# Patient Record
Sex: MALE | Race: BLACK OR AFRICAN AMERICAN | Employment: FULL TIME | URBAN - NONMETROPOLITAN AREA
[De-identification: names, ages, dates, MRNs, and addresses within clinical notes are randomized per-mention and may not be internally consistent; named-entity substitution may affect disease eponyms.]

---

## 2020-09-24 ENCOUNTER — OFFICE VISIT (OUTPATIENT)
Dept: PRIMARY CARE CLINIC | Age: 40
End: 2020-09-24
Payer: COMMERCIAL

## 2020-09-24 VITALS
SYSTOLIC BLOOD PRESSURE: 126 MMHG | DIASTOLIC BLOOD PRESSURE: 72 MMHG | BODY MASS INDEX: 36.73 KG/M2 | TEMPERATURE: 97.7 F | HEIGHT: 69 IN | OXYGEN SATURATION: 98 % | HEART RATE: 53 BPM | WEIGHT: 248 LBS

## 2020-09-24 PROCEDURE — 99213 OFFICE O/P EST LOW 20 MIN: CPT | Performed by: FAMILY MEDICINE

## 2020-09-24 RX ORDER — IBUPROFEN 600 MG/1
600 TABLET ORAL EVERY 8 HOURS PRN
Qty: 90 TABLET | Refills: 3 | Status: SHIPPED | OUTPATIENT
Start: 2020-09-24

## 2020-09-24 RX ORDER — CYCLOBENZAPRINE HCL 10 MG
10 TABLET ORAL 3 TIMES DAILY PRN
Qty: 30 TABLET | Refills: 0 | Status: SHIPPED | OUTPATIENT
Start: 2020-09-24 | End: 2020-10-04

## 2020-09-24 ASSESSMENT — ENCOUNTER SYMPTOMS
RESPIRATORY NEGATIVE: 1
GASTROINTESTINAL NEGATIVE: 1
BACK PAIN: 1
EYES NEGATIVE: 1
ALLERGIC/IMMUNOLOGIC NEGATIVE: 1

## 2020-09-24 ASSESSMENT — PATIENT HEALTH QUESTIONNAIRE - PHQ9
1. LITTLE INTEREST OR PLEASURE IN DOING THINGS: 0
SUM OF ALL RESPONSES TO PHQ QUESTIONS 1-9: 0
SUM OF ALL RESPONSES TO PHQ9 QUESTIONS 1 & 2: 0
2. FEELING DOWN, DEPRESSED OR HOPELESS: 0
SUM OF ALL RESPONSES TO PHQ QUESTIONS 1-9: 0

## 2020-09-24 NOTE — PROGRESS NOTES
2020     Levar Martinez (:  1980) is a 36 y.o. male, here for evaluation of the following medical concerns:    HPI   Acute urgent care visit for left sided back strain. He was picking dtr. 85lbs up to put her over a wood fence. Acute pain in left mid back. Had to lift her back over the fence after she retrieved a basketball. Pain persistent since. Feeling tight. No prior back issues. Using ice at present. No past medical history on file. No past surgical history on file. Review of Systems   Constitutional: Negative. HENT: Negative. Eyes: Negative. Respiratory: Negative. Cardiovascular: Negative. Gastrointestinal: Negative. Endocrine: Negative. Genitourinary: Negative. Musculoskeletal: Positive for back pain. Skin: Negative. Allergic/Immunologic: Negative. Neurological: Negative. Hematological: Negative. Psychiatric/Behavioral: Negative. Prior to Visit Medications    Not on File        Social History     Tobacco Use    Smoking status: Current Every Day Smoker     Packs/day: 0.50     Years: 4.00     Pack years: 2.00     Types: Cigarettes    Smokeless tobacco: Never Used   Substance Use Topics    Alcohol use: No        Vitals:    20 0831   BP: 126/72   Site: Left Upper Arm   Position: Sitting   Cuff Size: Large Adult   Pulse: 53   Temp: 97.7 °F (36.5 °C)   TempSrc: Tympanic   SpO2: 98%   Weight: 248 lb (112.5 kg)   Height: 5' 9\" (1.753 m)     Estimated body mass index is 36.62 kg/m² as calculated from the following:    Height as of this encounter: 5' 9\" (1.753 m). Weight as of this encounter: 248 lb (112.5 kg). Physical Exam  Constitutional:       Appearance: He is well-developed. HENT:      Head: Normocephalic and atraumatic. Eyes:      Pupils: Pupils are equal, round, and reactive to light. Neck:      Musculoskeletal: Normal range of motion and neck supple.    Cardiovascular:      Rate and Rhythm: Normal rate and regular rhythm. Heart sounds: Normal heart sounds. No murmur. Pulmonary:      Effort: Pulmonary effort is normal. No respiratory distress. Breath sounds: Normal breath sounds. No wheezing or rales. Abdominal:      General: There is no distension. Palpations: Abdomen is soft. There is no mass. Tenderness: There is no abdominal tenderness. Musculoskeletal: Normal range of motion. General: No tenderness. Back:    Skin:     General: Skin is warm. Findings: No erythema or rash. Neurological:      Mental Status: He is alert. Psychiatric:         Behavior: Behavior normal.         Thought Content: Thought content normal.         Judgment: Judgment normal.     /72 (Site: Left Upper Arm, Position: Sitting, Cuff Size: Large Adult)   Pulse 53   Temp 97.7 °F (36.5 °C) (Tympanic)   Ht 5' 9\" (1.753 m)   Wt 248 lb (112.5 kg)   SpO2 98%   BMI 36.62 kg/m²       ASSESSMENT/PLAN:  Thoracic strain, left of midline about T7 area. Discussed gentle stretching, massage. Plan ibuprofen 600mg and flexeril prn. Cautioned drowsiness. Recheck prn      An electronic signature was used to authenticate this note.     --Abe Hong MD on 9/24/2020 at 9:00 AM

## 2020-09-25 ENCOUNTER — OFFICE VISIT (OUTPATIENT)
Dept: FAMILY MEDICINE CLINIC | Age: 40
End: 2020-09-25
Payer: COMMERCIAL

## 2020-09-25 VITALS
HEART RATE: 71 BPM | DIASTOLIC BLOOD PRESSURE: 72 MMHG | TEMPERATURE: 97.7 F | RESPIRATION RATE: 16 BRPM | SYSTOLIC BLOOD PRESSURE: 136 MMHG | OXYGEN SATURATION: 98 %

## 2020-09-25 PROCEDURE — 99213 OFFICE O/P EST LOW 20 MIN: CPT | Performed by: NURSE PRACTITIONER

## 2020-09-25 ASSESSMENT — PATIENT HEALTH QUESTIONNAIRE - PHQ9
2. FEELING DOWN, DEPRESSED OR HOPELESS: 0
SUM OF ALL RESPONSES TO PHQ9 QUESTIONS 1 & 2: 0
1. LITTLE INTEREST OR PLEASURE IN DOING THINGS: 0
SUM OF ALL RESPONSES TO PHQ QUESTIONS 1-9: 0
SUM OF ALL RESPONSES TO PHQ QUESTIONS 1-9: 0

## 2020-09-25 ASSESSMENT — ENCOUNTER SYMPTOMS
ABDOMINAL PAIN: 0
VOMITING: 0
DIARRHEA: 1

## 2020-09-25 NOTE — PROGRESS NOTES
[facility] [place] [address] [address] [facility], APRN-[facility]  [address]  Phone:  [phone]  Fax:  [phone]  Xuan Hassan is a 36 y.o. male who presents today for his medical conditions/complaints as noted below. 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 The current episode started in the past 7 days. The problem occurs less than 2 times per day. The problem has been gradually improving. Pertinent negatives include no abdominal pain, chills, fever or vomiting. Risk factors: Took a 3 day cleanse and had diarrhea. Work made him leave. He has tried nothing for the symptoms. There is no history of inflammatory bowel disease, irritable bowel syndrome, malabsorption or short gut syndrome. Wt Readings from Last 3 Encounters:   09/24/20 248 lb (112.5 kg)   08/02/13 231 lb (104.8 kg)       Temp Readings from Last 3 Encounters:   09/25/20 97.7 °F (36.5 °C)   09/24/20 97.7 °F (36.5 °C) (Tympanic)   08/02/13 97.1 °F (36.2 °C) (Tympanic)       BP Readings from Last 3 Encounters:   09/25/20 136/72   09/24/20 126/72   08/02/13 120/82       Pulse Readings from Last 3 Encounters:   09/25/20 71   09/24/20 53   08/02/13 60              History reviewed. No pertinent past medical history. History reviewed. No pertinent surgical history. History reviewed. No pertinent family history.   Social History     Tobacco Use    Smoking status: Current Every Day Smoker     Packs/day: 0.50     Years: 4.00     Pack years: 2.00     Types: Cigarettes    Smokeless tobacco: Never Used   Substance Use Topics    Alcohol use: No      Current Outpatient Medications   Medication Sig Dispense Refill    cyclobenzaprine (FLEXERIL) 10 MG tablet Take 1 tablet by mouth 3 times daily as needed for Muscle spasms 30 tablet 0    ibuprofen (ADVIL;MOTRIN) 600 MG tablet Take 1 tablet by mouth every 8 hours as needed for Pain 90 tablet 3     No current facility-administered medications for this visit. Allergies   Allergen Reactions    Penicillins        No exam data present    Subjective:      Review of Systems   Constitutional: Negative for chills and fever. Gastrointestinal: Positive for diarrhea. Negative for abdominal pain and vomiting. Objective:     /72 (Site: Right Upper Arm, Position: Sitting, Cuff Size: Large Adult)   Pulse 71   Temp 97.7 °F (36.5 °C)   Resp 16   SpO2 98%     Physical Exam  Vitals signs reviewed. Constitutional:       General: He is not in acute distress. Appearance: He is well-developed. He is not ill-appearing, toxic-appearing or diaphoretic. HENT:      Head: Normocephalic. Right Ear: Tympanic membrane, ear canal and external ear normal.      Left Ear: Tympanic membrane, ear canal and external ear normal.      Nose: Nose normal. No mucosal edema, congestion or rhinorrhea. Mouth/Throat:      Mouth: Mucous membranes are moist. Mucous membranes are not pale and not dry. Pharynx: Oropharynx is clear. Eyes:      General: Lids are normal. No scleral icterus. Right eye: No discharge. Left eye: No discharge. Extraocular Movements:      Right eye: No nystagmus. Left eye: No nystagmus. Conjunctiva/sclera: Conjunctivae normal.   Neck:      Musculoskeletal: Full passive range of motion without pain and normal range of motion. Trachea: Trachea normal.   Cardiovascular:      Rate and Rhythm: Normal rate and regular rhythm. Heart sounds: Normal heart sounds. Pulmonary:      Effort: Pulmonary effort is normal. No accessory muscle usage or respiratory distress. Breath sounds: Normal breath sounds. Abdominal:      General: Abdomen is flat. Bowel sounds are normal.      Palpations: Abdomen is soft. Tenderness: There is no abdominal tenderness. Musculoskeletal: Normal range of motion. Skin:     General: Skin is warm and dry. Capillary Refill: Capillary refill takes less than 2 seconds. Coloration: Skin is not pale. Neurological:      Mental Status: He is alert and oriented to person, place, and time. Psychiatric:         Mood and Affect: Mood normal.         Speech: Speech normal.         Behavior: Behavior normal.         Thought Content: Thought content normal.         Judgment: Judgment normal.         Assessment:      Diagnosis Orders   1. Diarrhea, unspecified type       No results found for this visit on 09/25/20. Self induced due to cleanse. Resolved        Plan: May return to work at full capacity. Follow up with primary care provider in 1 to 2 days if needed. Patient Instructions   May return to work at full capacity. Follow up with primary care provider in 1 to 2 days if needed. Patient/Caregiver instructed on use, benefit, and side effects of prescribed medications. All patient/parent/caregiver questions answered. Patient/parent/caregiver voiced understanding. Reviewed health maintenance. Instructed to continue current medications, diet and exercise. Patient agreed with treatment plan. Follow up as directed.            Electronically signed by MICHAEL Jones NP on9/25/2020

## 2023-12-06 ENCOUNTER — OFFICE VISIT (OUTPATIENT)
Dept: FAMILY MEDICINE CLINIC | Age: 43
End: 2023-12-06
Payer: COMMERCIAL

## 2023-12-06 VITALS
HEART RATE: 60 BPM | HEIGHT: 70 IN | BODY MASS INDEX: 38.63 KG/M2 | DIASTOLIC BLOOD PRESSURE: 96 MMHG | TEMPERATURE: 99 F | WEIGHT: 269.8 LBS | OXYGEN SATURATION: 99 % | SYSTOLIC BLOOD PRESSURE: 158 MMHG

## 2023-12-06 DIAGNOSIS — H57.9 INJECTED EYE, LEFT: Primary | ICD-10-CM

## 2023-12-06 DIAGNOSIS — I10 HYPERTENSION, UNSPECIFIED TYPE: ICD-10-CM

## 2023-12-06 PROCEDURE — 99203 OFFICE O/P NEW LOW 30 MIN: CPT | Performed by: NURSE PRACTITIONER

## 2023-12-06 PROCEDURE — 3078F DIAST BP <80 MM HG: CPT | Performed by: NURSE PRACTITIONER

## 2023-12-06 PROCEDURE — 3074F SYST BP LT 130 MM HG: CPT | Performed by: NURSE PRACTITIONER

## 2023-12-06 RX ORDER — ACETAMINOPHEN 325 MG/1
650 TABLET ORAL EVERY 6 HOURS PRN
COMMUNITY

## 2023-12-06 ASSESSMENT — ENCOUNTER SYMPTOMS
VOMITING: 0
PHOTOPHOBIA: 0
EYE PAIN: 0
SINUS PRESSURE: 1
EYE DISCHARGE: 0
BLURRED VISION: 0
DOUBLE VISION: 0
EYE ITCHING: 0
NAUSEA: 0
EYE REDNESS: 1

## 2023-12-06 NOTE — PATIENT INSTRUCTIONS
Any changes in your vision of pain with light or movement of your eye you should go to er or call an eye doctor. Follow up in about 2 week for high blood pressure.

## 2023-12-06 NOTE — PROGRESS NOTES
Pupils are equal and reactive. I do not appreciate any abnormalities on funduscopic exam     Neck:      Thyroid: No thyromegaly. Vascular: No JVD. Trachea: Trachea normal.     Cardiovascular:      Rate and Rhythm: Normal rate and regular rhythm. Pulses: Normal pulses. Heart sounds: Normal heart sounds. No murmur heard. Pulmonary:      Effort: Pulmonary effort is normal. No respiratory distress. Breath sounds: Normal breath sounds. No wheezing or rales. Abdominal:      Palpations: Abdomen is soft. Musculoskeletal:         General: Normal range of motion. Cervical back: Normal range of motion and neck supple. No rigidity. Pain with movement (slighty tight) and muscular tenderness present. Normal range of motion. Lymphadenopathy:      Cervical: No cervical adenopathy. Right cervical: No superficial or posterior cervical adenopathy. Left cervical: No superficial or posterior cervical adenopathy. Skin:     General: Skin is warm and dry. Capillary Refill: Capillary refill takes less than 2 seconds. Findings: No rash. Neurological:      General: No focal deficit present. Mental Status: He is alert and oriented to person, place, and time. Mental status is at baseline. Psychiatric:         Mood and Affect: Mood normal.         Behavior: Behavior normal.         Judgment: Judgment normal.           Discussed exam, POCT findings, plan of care, and follow-up at length with patient and/or their caregiver. Reviewed all prescribed and recommended medications, administration and side effects. Encouraged patient to follow up with PCP or return to the clinic for no improvement and or worsening of symptoms. All questions were addressed and answered with verbalization of understanding. The patient and/or the caregiver was agreeable with the plan.      Electronically signed by MICHAEL Ash CNP on 12/6/2023 at 5:29 PM

## 2023-12-07 ASSESSMENT — VISUAL ACUITY: OU: 1

## 2023-12-07 ASSESSMENT — ENCOUNTER SYMPTOMS: SHORTNESS OF BREATH: 0

## 2023-12-29 ENCOUNTER — OFFICE VISIT (OUTPATIENT)
Dept: FAMILY MEDICINE CLINIC | Age: 43
End: 2023-12-29
Payer: COMMERCIAL

## 2023-12-29 VITALS
DIASTOLIC BLOOD PRESSURE: 84 MMHG | BODY MASS INDEX: 38.11 KG/M2 | WEIGHT: 272.2 LBS | SYSTOLIC BLOOD PRESSURE: 154 MMHG | OXYGEN SATURATION: 93 % | HEIGHT: 71 IN | HEART RATE: 85 BPM | TEMPERATURE: 99.5 F

## 2023-12-29 DIAGNOSIS — M79.10 MYALGIA: ICD-10-CM

## 2023-12-29 DIAGNOSIS — R50.9 FEVER, UNSPECIFIED FEVER CAUSE: ICD-10-CM

## 2023-12-29 DIAGNOSIS — B34.9 VIRAL ILLNESS: Primary | ICD-10-CM

## 2023-12-29 LAB
INFLUENZA A ANTIGEN, POC: NEGATIVE
INFLUENZA B ANTIGEN, POC: NEGATIVE
LOT EXPIRE DATE: NORMAL
LOT KIT NUMBER: NORMAL
SARS-COV-2, POC: NORMAL
VALID INTERNAL CONTROL: NORMAL
VENDOR AND KIT NAME POC: NORMAL

## 2023-12-29 PROCEDURE — 99213 OFFICE O/P EST LOW 20 MIN: CPT | Performed by: NURSE PRACTITIONER

## 2023-12-29 PROCEDURE — 87428 SARSCOV & INF VIR A&B AG IA: CPT | Performed by: NURSE PRACTITIONER

## 2023-12-29 NOTE — PATIENT INSTRUCTIONS
Tylenol Sinus for Headaches. Recommended over the counter medications/treatments: The use of an antihistamine (Claritin or Zyrtec) may help with sinus congestion and drainage. A nasal steroid spray (Flonase or Nasacort) should be used to help decrease swelling and irritation in the sinuses to reduce congestion and drainage. Mucinex (12 hour) will also help to thin mucus so that it drains easier and improves congestion. Works best if you are drinking plenty of water. Honey with or without Lemon may also help with coughing. Alternating hot liquids with cold liquids helps to soothe a sore throat. Use Acetaminophen (Tylenol) to help relieve fever, chills or body aches. If allowed, you may alternate using ibuprofen (Motrin) and Tylenol. Do not exceed 3,000mg of Tylenol in a 24 hour time period. Make sure to stay well hydrated by drinking plenty of water. Follow up with primary care provider in 2 to 3 days or as needed if no improvement or worsening of your symptoms.

## 2023-12-29 NOTE — PROGRESS NOTES
[facility] department of [facility]  [address] [address]  Phone: [phone]  Fax: [phone]      King Saavedra is a 37 y.o. male who presents to the [address] [place] [address] today for his medical conditions/complaints as noted below. King Saavedra is c/o of URI (Starting yesterday afternoon, started with chills after going to gym. Couldn't get warm. Went to work and didn't feel right - saw nurse. Had temp of 103. He was sent home. Right ear hurts. Some headache yesterday, not today. Sinuses hurt when he coughs. Increased fatigue. Exhausted. Denies sore throat.) and Hypertension (Follow up BP)      Assessment and Plan     1. Viral illness  Discussed viral illness with patient and appropriate timing and use of antibiotics. They were encouraged to try symptomatic supportive therapies and list of OTC medications that are safe and effective was provided to them in the after visit summary. 2. Fever, unspecified fever cause  - POCT COVID-19 & Influenza A/B    3. Myalgia    Blood pressure remains elevated today but he obviously is not feeling well and has had fever. He states that he has been working on his diet and has been exercising regularly. He has a regular follow up appointment scheduled for next week and I explained that we can discuss his BP at that time and this will also allow him time to recover from the viral illness and if he continues to not feel well or have changes in his symptoms that he may require antibiotics at that time.     Office Visit on 2023   Component Date Value Ref Range Status    VALID INTERNAL CONTROL 2023 PASS   Final    Lot/Kit Number [id number]   Final    Lot/Kit  date: 2023 11/10/2024   Final    SARS-COV-2, POC 2023 Not-Detected  Not Detected Final    Influenza A Antigen, POC 2023 Negative   Final    Influenza B Antigen, POC 2023 Negative   Final    Vendor

## 2025-02-04 ENCOUNTER — OFFICE VISIT (OUTPATIENT)
Dept: FAMILY MEDICINE CLINIC | Age: 45
End: 2025-02-04
Payer: COMMERCIAL

## 2025-02-04 VITALS
OXYGEN SATURATION: 98 % | HEART RATE: 72 BPM | RESPIRATION RATE: 12 BRPM | SYSTOLIC BLOOD PRESSURE: 140 MMHG | DIASTOLIC BLOOD PRESSURE: 100 MMHG | BODY MASS INDEX: 38.48 KG/M2 | WEIGHT: 268.8 LBS | HEIGHT: 70 IN

## 2025-02-04 DIAGNOSIS — Z00.01 ENCOUNTER FOR GENERAL ADULT MEDICAL EXAMINATION WITH ABNORMAL FINDINGS: Primary | ICD-10-CM

## 2025-02-04 DIAGNOSIS — Z13.1 SCREENING FOR DIABETES MELLITUS: ICD-10-CM

## 2025-02-04 DIAGNOSIS — Z13.220 SCREENING FOR CHOLESTEROL LEVEL: ICD-10-CM

## 2025-02-04 DIAGNOSIS — Z76.89 ENCOUNTER TO ESTABLISH CARE: ICD-10-CM

## 2025-02-04 DIAGNOSIS — Z20.2 EXPOSURE TO HUMAN PAPILLOMAVIRUS: ICD-10-CM

## 2025-02-04 DIAGNOSIS — R03.0 ELEVATED BLOOD PRESSURE READING: ICD-10-CM

## 2025-02-04 PROCEDURE — 99396 PREV VISIT EST AGE 40-64: CPT

## 2025-02-04 SDOH — ECONOMIC STABILITY: FOOD INSECURITY: WITHIN THE PAST 12 MONTHS, YOU WORRIED THAT YOUR FOOD WOULD RUN OUT BEFORE YOU GOT MONEY TO BUY MORE.: NEVER TRUE

## 2025-02-04 SDOH — ECONOMIC STABILITY: FOOD INSECURITY: WITHIN THE PAST 12 MONTHS, THE FOOD YOU BOUGHT JUST DIDN'T LAST AND YOU DIDN'T HAVE MONEY TO GET MORE.: NEVER TRUE

## 2025-02-04 ASSESSMENT — PATIENT HEALTH QUESTIONNAIRE - PHQ9
SUM OF ALL RESPONSES TO PHQ QUESTIONS 1-9: 0
2. FEELING DOWN, DEPRESSED OR HOPELESS: NOT AT ALL
SUM OF ALL RESPONSES TO PHQ9 QUESTIONS 1 & 2: 0
SUM OF ALL RESPONSES TO PHQ QUESTIONS 1-9: 0
1. LITTLE INTEREST OR PLEASURE IN DOING THINGS: NOT AT ALL
SUM OF ALL RESPONSES TO PHQ QUESTIONS 1-9: 0
SUM OF ALL RESPONSES TO PHQ QUESTIONS 1-9: 0

## 2025-02-04 NOTE — PROGRESS NOTES
2025    Laron Kramer (:  1980) is a 44 y.o. male, here for a preventive medicine evaluation.      Subjective   Patient Active Problem List   Diagnosis    Hemorrhoids       Review of Systems   Constitutional:  Negative for appetite change, chills, fatigue and fever.   HENT:  Negative for congestion, ear pain and sore throat.    Respiratory:  Negative for cough and shortness of breath.    Cardiovascular:  Negative for chest pain and palpitations.   Gastrointestinal:  Negative for abdominal pain, constipation, diarrhea and vomiting.   Genitourinary:  Negative for difficulty urinating.   Musculoskeletal:  Negative for arthralgias and myalgias.   Neurological:  Negative for dizziness.       Prior to Visit Medications    Medication Sig Taking? Authorizing Provider   NONFORMULARY Taking workout supplement Yes Audrey Watson MD   acetaminophen (TYLENOL) 325 MG tablet Take 2 tablets by mouth every 6 hours as needed for Pain  Patient not taking: Reported on 2025  Audrey Watson MD   ibuprofen (ADVIL;MOTRIN) 600 MG tablet Take 1 tablet by mouth every 8 hours as needed for Pain  Patient not taking: Reported on 2025  Naresh Maloney MD        Allergies   Allergen Reactions    Penicillins        No past medical history on file.    No past surgical history on file.    Social History     Socioeconomic History    Marital status:      Spouse name: Not on file    Number of children: Not on file    Years of education: Not on file    Highest education level: Not on file   Occupational History    Not on file   Tobacco Use    Smoking status: Every Day     Current packs/day: 0.50     Average packs/day: 0.5 packs/day for 24.1 years (12.0 ttl pk-yrs)     Types: Cigarettes     Start date:      Passive exposure: Past    Smokeless tobacco: Never   Vaping Use    Vaping status: Never Used   Substance and Sexual Activity    Alcohol use: No    Drug use: No    Sexual activity: Not on file   Other

## 2025-02-05 ASSESSMENT — ENCOUNTER SYMPTOMS
SORE THROAT: 0
ABDOMINAL PAIN: 0
SHORTNESS OF BREATH: 0
CONSTIPATION: 0
VOMITING: 0
DIARRHEA: 0
COUGH: 0

## 2025-02-11 ENCOUNTER — NURSE ONLY (OUTPATIENT)
Dept: FAMILY MEDICINE CLINIC | Age: 45
End: 2025-02-11

## 2025-02-11 VITALS — SYSTOLIC BLOOD PRESSURE: 130 MMHG | DIASTOLIC BLOOD PRESSURE: 98 MMHG
